# Patient Record
Sex: FEMALE | Race: WHITE | NOT HISPANIC OR LATINO | URBAN - METROPOLITAN AREA
[De-identification: names, ages, dates, MRNs, and addresses within clinical notes are randomized per-mention and may not be internally consistent; named-entity substitution may affect disease eponyms.]

---

## 2021-11-24 ENCOUNTER — OFFICE VISIT (OUTPATIENT)
Dept: FAMILY MEDICINE CLINIC | Facility: CLINIC | Age: 21
End: 2021-11-24

## 2021-11-24 VITALS
RESPIRATION RATE: 16 BRPM | HEIGHT: 67 IN | HEART RATE: 72 BPM | TEMPERATURE: 96.6 F | OXYGEN SATURATION: 98 % | BODY MASS INDEX: 24.8 KG/M2 | DIASTOLIC BLOOD PRESSURE: 69 MMHG | SYSTOLIC BLOOD PRESSURE: 107 MMHG | WEIGHT: 158 LBS

## 2021-11-24 DIAGNOSIS — Z00.00 GENERAL MEDICAL EXAMINATION: ICD-10-CM

## 2021-11-24 DIAGNOSIS — F32.A DEPRESSION, UNSPECIFIED DEPRESSION TYPE: Primary | ICD-10-CM

## 2021-11-24 PROCEDURE — 99203 OFFICE O/P NEW LOW 30 MIN: CPT

## 2021-11-24 RX ORDER — ESCITALOPRAM OXALATE 20 MG/1
20 TABLET ORAL DAILY
COMMUNITY
End: 2021-11-24 | Stop reason: SDUPTHER

## 2021-11-24 RX ORDER — ESCITALOPRAM OXALATE 20 MG/1
20 TABLET ORAL DAILY
Qty: 90 TABLET | Refills: 1 | Status: SHIPPED | OUTPATIENT
Start: 2021-11-24

## 2021-11-24 RX ORDER — BUPROPION HYDROCHLORIDE 150 MG/1
150 TABLET ORAL DAILY
Qty: 30 TABLET | Refills: 1 | Status: SHIPPED | OUTPATIENT
Start: 2021-11-24 | End: 2021-12-06 | Stop reason: SINTOL

## 2021-11-24 NOTE — PROGRESS NOTES
"Chief Complaint  Depression    Subjective          Stacy Sánchez presents to [facility]  History of Present Illness    Stacy Sánchez female 20 y.o., /69   Pulse 72   Temp 96.6 °F (35.9 °C)   Resp 16   Ht 170.2 cm (67\")   Wt 71.7 kg (158 lb)   SpO2 98%   BMI 24.75 kg/m²   who presents today for as a new patient for evaluation and treatment of Depression and Anxiety.  She reports depressed mood, crying spells, early AM awakening, fatigue, anxiety, anhedonia, impaired concentration, excessive worry, unable to relax, irritable, medication is working well, patient desires to continue on Escitalopram Rx, and needs refill. She reports the Escitalopram is working but has worked better in the past. Onset of symptoms was approximately 1 1/2 weeks ago.  She denies current suicidal and homicidal ideation. Risk factors are grief and recently moving from [place].  Previous treatment includes medication (SSRI) and therapy. She complains of the following medication side effects: none. The patient has previously been in counseling, agrees to start counseling, and has made an appointment with a local therapist.     PHQ-9 Depression scale score: 18       She has a problem list of the following: There is no problem list on file for this patient.        She has been compliant with the following medications:   Current Outpatient Medications:   •  escitalopram (LEXAPRO) 20 MG tablet, Take 1 tablet by mouth Daily., Disp: 90 tablet, Rfl: 1  •  buPROPion XL (Wellbutrin XL) 150 MG 24 hr tablet, Take 1 tablet by mouth Daily., Disp: 30 tablet, Rfl: 1.        She  denies medication side effects.      All of the other chronic condition(s) listed above are stable w/o issues.    /69   Pulse 72   Temp 96.6 °F (35.9 °C)   Resp 16   Ht 170.2 cm (67\")   Wt 71.7 kg (158 lb)   SpO2 98%   BMI 24.75 kg/m²     No results found for this or any previous visit.               Objective     Vital Signs:   BP " "107/69   Pulse 72   Temp 96.6 °F (35.9 °C)   Resp 16   Ht 170.2 cm (67\")   Wt 71.7 kg (158 lb)   SpO2 98%   BMI 24.75 kg/m²      Review of Systems   Constitutional: Negative for appetite change and fever.   HENT: Negative for nosebleeds and trouble swallowing.    Eyes: Negative for blurred vision and visual disturbance.   Respiratory: Negative for choking, shortness of breath and wheezing.    Cardiovascular: Negative for chest pain and palpitations.   Gastrointestinal: Negative for abdominal pain, blood in stool, constipation, diarrhea, nausea and vomiting.   Genitourinary: Negative.    Musculoskeletal: Negative.    Skin: Negative.    Allergic/Immunologic: Negative for immunocompromised state.   Neurological: Negative for syncope, facial asymmetry and speech difficulty.   Hematological: Negative for adenopathy.   Psychiatric/Behavioral: Positive for decreased concentration, sleep disturbance, depressed mood and stress. Negative for agitation, behavioral problems, dysphoric mood, self-injury, suicidal ideas and negative for hyperactivity. The patient is nervous/anxious.          Physical Exam  Vitals and nursing note reviewed.   Constitutional:       General: She is not in acute distress.     Appearance: Normal appearance. She is well-developed. She is not ill-appearing or toxic-appearing.   HENT:      Head: Normocephalic.      Right Ear: External ear normal.      Left Ear: External ear normal.   Eyes:      General: No scleral icterus.     Pupils: Pupils are equal, round, and reactive to light.   Neck:      Thyroid: No thyromegaly.      Vascular: No carotid bruit.   Cardiovascular:      Rate and Rhythm: Normal rate and regular rhythm.      Heart sounds: Normal heart sounds.   Pulmonary:      Effort: Pulmonary effort is normal. No respiratory distress.      Breath sounds: Normal breath sounds. No stridor.   Musculoskeletal:         General: No deformity.      Cervical back: Normal range of motion and neck " supple.      Right lower leg: No edema.      Left lower leg: No edema.   Skin:     General: Skin is warm.      Coloration: Skin is not jaundiced.   Neurological:      General: No focal deficit present.      Mental Status: She is alert and oriented to person, place, and time.      Motor: No weakness.   Psychiatric:         Attention and Perception: Attention normal.         Mood and Affect: Mood normal. Mood is not anxious or depressed. Affect is not tearful.         Speech: Speech normal.         Behavior: Behavior normal. Behavior is cooperative.         Thought Content: Thought content normal. Thought content does not include suicidal ideation. Thought content does not include suicidal plan.         Cognition and Memory: Cognition normal.         Judgment: Judgment normal.          Result Review :                Assessment and Plan      Diagnoses and all orders for this visit:    1. Depression, unspecified depression type (Primary)  -     Comprehensive metabolic panel  -     Lipid panel  -     CBC and Differential  -     TSH  -     buPROPion XL (Wellbutrin XL) 150 MG 24 hr tablet; Take 1 tablet by mouth Daily.  Dispense: 30 tablet; Refill: 1  -     escitalopram (LEXAPRO) 20 MG tablet; Take 1 tablet by mouth Daily.  Dispense: 90 tablet; Refill: 1    2. General medical examination  -     Comprehensive metabolic panel  -     Lipid panel  -     CBC and Differential  -     TSH            Follow Up     Return in about 6 weeks (around 1/5/2022) for Next scheduled follow up.    Patient was given instructions and counseling regarding her condition or for health maintenance advice. Please see specific information pulled into the AVS if appropriate.  After visit summary information was given to the patient today regarding managing depression as a teen.    Low glycemic index diet  Exercise 30 minutes most days of the week  Make sure you get results on any labs or tests we ordered today  We discussed medications and how to  take them as prescribed  Sleep 6-8 hours each night if possible  If you have not signed up for [employer], please activate your code ASAP from your After Visit Summary today    LDL goal <100  HDL goal >60  Triglyceride goal <150  BP goal =<130/80  Fasting glucose <100    Plan:  Patient's depression is recurrent and is moderate without psychosis. Their depression is currently active and the condition is worsening due to situational stressors and recent loss of grandparent. This will be reassessed in 6 weeks. F/U as described:patient will continue current medication therapy and will add Wellbutrin today.   Take all medications as prescribed.  Lab orders were placed today.  The patient has an upcoming appointment with a local therapist.  Bonds for safety were provided today.  The patient was encouraged to seek immediate mental health attention at [facility] emergency psychiatric services for worsening depression, suicidal thoughts, suicidal plan, or thoughts of self-harm.  The patient denies suicidal thoughts, suicidal plan, or thoughts of self-harm. The patient verbalized understanding of all instructions given today.  Will follow up with this patient in 6 weeks to reevaluate medication therapy.

## 2021-11-24 NOTE — PATIENT INSTRUCTIONS
"http://APA.org\"> https://clinicalkey.com\"> https://clinicalkey.com\"> http://point-of-care.Texxi.com\">   Managing Depression, Teen  Depression is a mental health condition that can affect your thoughts, feelings, and behavior. You may feel down, blue, or sad, or you may be irritable and sylvester. If you have been diagnosed with depression, you may be relieved to know now why you have felt or behaved a certain way. If you are living with depression, there are ways to help you relieve the symptoms and feel better.  How to manage lifestyle changes  Managing stress  Stress is your body's reaction to life's demands. You can have stress from good things, such as a vacation, or difficult things, such as a hard test. Stress that lasts a long time can play a part in depression, so it is important to learn how to manage stress.  Try some of the following approaches for reducing your tension and helping to manage stress (stress reduction techniques):  · If you play an instrument, take some time to play it, or listen to music that helps you feel calm.  · Try using a meditation rosa elena.  · Do some deep breathing. To do this, inhale slowly through your nose. Pause at the top of your inhale for a few seconds and then exhale slowly, letting yourself relax. Repeat this three or four times.  There are several other things you can do to help you manage depression, such as:  · Spending time in nature.  · Spending time with trusted friends who help you feel better.  · Taking time to think about the positive things in your life.  · Exercising, such as playing an active game with friends or going for a run or a bike ride.  · Spending less time using electronics, especially at night before bed. Using electronic screens before bed makes your brain think it is time to get up rather than go to bed.  · Limit how much time you watch TV or play video games. These activities might feel good for a while, but in the end, they are a way " to avoid the feelings of depression.  Medicines  Antidepressants are often prescribed by a health care provider to ease the symptoms of depression. When used together, medicines, psychotherapy, and stress reduction techniques are often the most effective treatment.  Medicines take time to work. You may not notice the full benefits of your medicine for 4-8 weeks.  · Do not stop taking your medicine. Talk to your health care provider and have a plan to lower your dose safely.  Relationships    Relationships are important to people throughout their lives. Friends and family can be great resources to help you deal with the difficult feelings you get from depression. Talk to family and friends when things are becoming difficult. You may also want to talk with a therapist. A relationship with a therapist may be very important to helping you manage your depression.  How to recognize changes  Everyone responds differently to treatment for depression. Recovery from depression happens when your symptoms have gone away and you may:  · Have more interest in doing activities.  · Feel hopeful again.  · Have more energy.  · Have fewer problems with eating too much or too little food.  · Have better mental focus.  If you find your depression does not change, you may still:  · Have problems sleeping or waking, feel tired all the time, or have trouble focusing.  · Have changes in your appetite. You may lose or gain weight without trying.  · Have constant headaches or stomachaches.  · Want to be alone or avoid interacting with others.  · Lack interest in doing the things you usually like to do.  · Feel angry or irritated most of the time.  · Think about death, or consider suicide.  · Use alcohol, drugs, or tobacco or nicotine products.  Follow these instructions at home:  Activity    · Spend time with trusted friends who help you feel better.  · Get some form of activity each day, such as walking, biking, or any movement activity you  enjoy.  · Practice self-calming and other stress reduction techniques.    Lifestyle  · Get the right amount and quality of sleep.  · Do not use drugs. Do not drink alcohol.  · Eat a healthy diet that includes plenty of vegetables, fruits, whole grains, low-fat dairy products, and lean protein. Do not eat a lot of foods that are high in solid fats, added sugars, or salt (sodium).  General instructions  · Take over-the-counter and prescription medicines only as told by your health care provider. Tell your health care provider about the positive and negative effects you are having from your medicines.  · Keep all follow-up visits as told by your health care provider. This is important.  Where to find support  Talking to others  Although depression is serious, support is available. Resources may include:  · Suicide prevention, crisis prevention, and depression hotlines.  · School teachers, counselors, coaches, or clergy.  · Parents or other family members.  · Trusted friends.  · Support groups.  Therapy and support groups  You can locate a counselor or support group from one of these sources:  · Anxiety and Depression Association of [place] (ADAA): www.adaa.org  · Mental Health [place]: www.mentalhealthamerica.net  · National [place] on Mental Illness (GAGE): www.gage.org  Contact a health care provider if:  · You stop taking your antidepressant medicines, and you have any of these symptoms:  ? Nausea.  ? Headache.  ? Light-headedness.  ? Chills and body aches.  ? Not being able to sleep (insomnia).  · You or your friends and family think your depression is getting worse.  Get help right away if:  · You feel suicidal and are planning suicide.  · You are drinking or using drugs excessively.  · You are cutting yourself or thinking about it.  · You are thinking about hurting others and are making a plan to do so.  If you ever feel like you may hurt yourself or others, or have thoughts about taking your own life, get help  right away. Go to your nearest emergency department or:  · Call your local emergency services (911 in the [place]  · Call a suicide crisis helpline, such as the National Suicide Prevention Lifeline at 1-326.125.4983. This is open 24 hours a day in the [place]  · Text the Crisis Text Line at 705448 (in the [place]  Summary  · There are ways to help you relieve your symptoms of depression.  · Work with your health care provider on a care plan that includes stress reduction techniques, medicines (if applicable), therapy, and healthy lifestyle habits.  · A relationship with a therapist may be very important to helping you manage your depression.  · If you have thoughts about taking your own life, call a suicide crisis helpline or text a crisis text line.  This information is not intended to replace advice given to you by your health care provider. Make sure you discuss any questions you have with your health care provider.  Document Revised: 10/28/2020 Document Reviewed: 10/28/2020  Elsevier Patient Education © 2021 [employer].

## 2021-11-25 LAB
ALBUMIN SERPL-MCNC: 4.7 G/DL (ref 3.9–5)
ALBUMIN/GLOB SERPL: 1.6 {RATIO} (ref 1.2–2.2)
ALP SERPL-CCNC: 67 IU/L (ref 42–106)
ALT SERPL-CCNC: 13 IU/L (ref 0–32)
AST SERPL-CCNC: 17 IU/L (ref 0–40)
BASOPHILS # BLD AUTO: 0 X10E3/UL (ref 0–0.2)
BASOPHILS NFR BLD AUTO: 1 %
BILIRUB SERPL-MCNC: 0.3 MG/DL (ref 0–1.2)
BUN SERPL-MCNC: 9 MG/DL (ref 6–20)
BUN/CREAT SERPL: 13 (ref 9–23)
CALCIUM SERPL-MCNC: 10.1 MG/DL (ref 8.7–10.2)
CHLORIDE SERPL-SCNC: 101 MMOL/L (ref 96–106)
CHOLEST SERPL-MCNC: 187 MG/DL (ref 100–169)
CO2 SERPL-SCNC: 25 MMOL/L (ref 20–29)
CREAT SERPL-MCNC: 0.67 MG/DL (ref 0.57–1)
EOSINOPHIL # BLD AUTO: 0 X10E3/UL (ref 0–0.4)
EOSINOPHIL NFR BLD AUTO: 1 %
ERYTHROCYTE [DISTWIDTH] IN BLOOD BY AUTOMATED COUNT: 12.7 % (ref 11.7–15.4)
GLOBULIN SER CALC-MCNC: 3 G/DL (ref 1.5–4.5)
GLUCOSE SERPL-MCNC: 90 MG/DL (ref 65–99)
HCT VFR BLD AUTO: 40 % (ref 34–46.6)
HDLC SERPL-MCNC: 46 MG/DL
HGB BLD-MCNC: 13.3 G/DL (ref 11.1–15.9)
IMM GRANULOCYTES # BLD AUTO: 0 X10E3/UL (ref 0–0.1)
IMM GRANULOCYTES NFR BLD AUTO: 0 %
LDLC SERPL CALC-MCNC: 126 MG/DL (ref 0–109)
LYMPHOCYTES # BLD AUTO: 2 X10E3/UL (ref 0.7–3.1)
LYMPHOCYTES NFR BLD AUTO: 26 %
MCH RBC QN AUTO: 29.2 PG (ref 26.6–33)
MCHC RBC AUTO-ENTMCNC: 33.3 G/DL (ref 31.5–35.7)
MCV RBC AUTO: 88 FL (ref 79–97)
MONOCYTES # BLD AUTO: 0.6 X10E3/UL (ref 0.1–0.9)
MONOCYTES NFR BLD AUTO: 8 %
NEUTROPHILS # BLD AUTO: 4.9 X10E3/UL (ref 1.4–7)
NEUTROPHILS NFR BLD AUTO: 64 %
PLATELET # BLD AUTO: 274 X10E3/UL (ref 150–450)
POTASSIUM SERPL-SCNC: 4.5 MMOL/L (ref 3.5–5.2)
PROT SERPL-MCNC: 7.7 G/DL (ref 6–8.5)
RBC # BLD AUTO: 4.56 X10E6/UL (ref 3.77–5.28)
SODIUM SERPL-SCNC: 139 MMOL/L (ref 134–144)
TRIGL SERPL-MCNC: 79 MG/DL (ref 0–89)
TSH SERPL DL<=0.005 MIU/L-ACNC: 1.39 UIU/ML (ref 0.45–4.5)
VLDLC SERPL CALC-MCNC: 15 MG/DL (ref 5–40)
WBC # BLD AUTO: 7.6 X10E3/UL (ref 3.4–10.8)

## 2021-11-26 NOTE — PROGRESS NOTES
Please let the patient know her total and LDL cholesterol were mildly elevated. I need her to work on a low cholesterol diet and daily exercise. I will re-check this at her next appointment. All other labs are normal.

## 2021-12-06 ENCOUNTER — OFFICE VISIT (OUTPATIENT)
Dept: FAMILY MEDICINE CLINIC | Facility: CLINIC | Age: 21
End: 2021-12-06

## 2021-12-06 VITALS
WEIGHT: 155 LBS | OXYGEN SATURATION: 99 % | HEART RATE: 83 BPM | RESPIRATION RATE: 16 BRPM | DIASTOLIC BLOOD PRESSURE: 66 MMHG | SYSTOLIC BLOOD PRESSURE: 101 MMHG | TEMPERATURE: 97.1 F | BODY MASS INDEX: 24.33 KG/M2 | HEIGHT: 67 IN

## 2021-12-06 DIAGNOSIS — F32.A DEPRESSION, UNSPECIFIED DEPRESSION TYPE: Primary | ICD-10-CM

## 2021-12-06 PROCEDURE — 99214 OFFICE O/P EST MOD 30 MIN: CPT

## 2021-12-06 NOTE — PROGRESS NOTES
"Chief Complaint  Blurred vision due to medication    Subjective          Stacy Sánchez presents to [facility]  History of Present Illness    Stacy Sánchez female 20 y.o., /66   Pulse 83   Temp 97.1 °F (36.2 °C)   Resp 16   Ht 170.2 cm (67\")   Wt 70.3 kg (155 lb)   SpO2 99%   BMI 24.28 kg/m²   who presents today for follow up of Depression.  She reports the Wellbutrin that was added at the last visit is helping, but she is experiencing side effects of blurred vision. Onset of symptoms was approximately 4 days ago. She denies current suicidal and homicidal ideation. Risk factors are family situation/stress and grief.  Previous treatment includes medication (SSRI). She complains of the following medication side effects:blurred vision.  No headaches, dizziness, seizures, near-syncope, syncope, or weakness.  The patient is currently in counseling.    The patient was started on Wellbutrin  mg on 11/24/2021. She was already taking Escitalopram 20 mg daily. She started taking the Wellbutrin XL on 11/25/2021 and then started experiencing blurred vision about one week after starting the medication. She has been experiencing blurred vision daily.     She has a problem list of the following: There is no problem list on file for this patient.        She has been compliant with the following medications:   Current Outpatient Medications:   •  escitalopram (LEXAPRO) 20 MG tablet, Take 1 tablet by mouth Daily., Disp: 90 tablet, Rfl: 1.        She  denies medication side effects.      All of the other chronic condition(s) listed above are stable w/o issues.    /66   Pulse 83   Temp 97.1 °F (36.2 °C)   Resp 16   Ht 170.2 cm (67\")   Wt 70.3 kg (155 lb)   SpO2 99%   BMI 24.28 kg/m²     Results for orders placed or performed in visit on 11/24/21   Comprehensive metabolic panel    Specimen: Blood   Result Value Ref Range    Glucose 90 65 - 99 mg/dL    BUN 9 6 - 20 mg/dL    " "Creatinine 0.67 0.57 - 1.00 mg/dL    eGFR Non African Am 128 >59 mL/min/1.73    eGFR African Am 147 >59 mL/min/1.73    BUN/Creatinine Ratio 13 9 - 23    Sodium 139 134 - 144 mmol/L    Potassium 4.5 3.5 - 5.2 mmol/L    Chloride 101 96 - 106 mmol/L    Total CO2 25 20 - 29 mmol/L    Calcium 10.1 8.7 - 10.2 mg/dL    Total Protein 7.7 6.0 - 8.5 g/dL    Albumin 4.7 3.9 - 5.0 g/dL    Globulin 3.0 1.5 - 4.5 g/dL    A/G Ratio 1.6 1.2 - 2.2    Total Bilirubin 0.3 0.0 - 1.2 mg/dL    Alkaline Phosphatase 67 42 - 106 IU/L    AST (SGOT) 17 0 - 40 IU/L    ALT (SGPT) 13 0 - 32 IU/L   Lipid panel    Specimen: Blood   Result Value Ref Range    Total Cholesterol 187 (H) 100 - 169 mg/dL    Triglycerides 79 0 - 89 mg/dL    HDL Cholesterol 46 >39 mg/dL    VLDL Cholesterol Hector 15 5 - 40 mg/dL    LDL Chol Calc (NIH) 126 (H) 0 - 109 mg/dL   TSH    Specimen: Blood   Result Value Ref Range    TSH 1.390 0.450 - 4.500 uIU/mL   CBC and Differential    Specimen: Blood   Result Value Ref Range    WBC 7.6 3.4 - 10.8 x10E3/uL    RBC 4.56 3.77 - 5.28 x10E6/uL    Hemoglobin 13.3 11.1 - 15.9 g/dL    Hematocrit 40.0 34.0 - 46.6 %    MCV 88 79 - 97 fL    MCH 29.2 26.6 - 33.0 pg    MCHC 33.3 31.5 - 35.7 g/dL    RDW 12.7 11.7 - 15.4 %    Platelets 274 150 - 450 x10E3/uL    Neutrophil Rel % 64 Not Estab. %    Lymphocyte Rel % 26 Not Estab. %    Monocyte Rel % 8 Not Estab. %    Eosinophil Rel % 1 Not Estab. %    Basophil Rel % 1 Not Estab. %    Neutrophils Absolute 4.9 1.4 - 7.0 x10E3/uL    Lymphocytes Absolute 2.0 0.7 - 3.1 x10E3/uL    Monocytes Absolute 0.6 0.1 - 0.9 x10E3/uL    Eosinophils Absolute 0.0 0.0 - 0.4 x10E3/uL    Basophils Absolute 0.0 0.0 - 0.2 x10E3/uL    Immature Granulocyte Rel % 0 Not Estab. %    Immature Grans Absolute 0.0 0.0 - 0.1 x10E3/uL                  Objective     Vital Signs:   /66   Pulse 83   Temp 97.1 °F (36.2 °C)   Resp 16   Ht 170.2 cm (67\")   Wt 70.3 kg (155 lb)   SpO2 99%   BMI 24.28 kg/m²      Review of " Systems   Constitutional: Negative for appetite change and fever.   HENT: Negative for nosebleeds and trouble swallowing.    Eyes: Positive for blurred vision. Negative for double vision and visual disturbance.   Respiratory: Negative for choking, shortness of breath and wheezing.    Cardiovascular: Negative for chest pain and palpitations.   Gastrointestinal: Negative for abdominal pain and blood in stool.   Genitourinary: Negative.    Musculoskeletal: Negative.    Skin: Negative.    Allergic/Immunologic: Negative for immunocompromised state.   Neurological: Negative for dizziness, seizures, syncope, facial asymmetry, speech difficulty, weakness, light-headedness, numbness and headache.   Hematological: Negative for adenopathy.   Psychiatric/Behavioral: Negative for dysphoric mood, self-injury, suicidal ideas, depressed mood and stress. The patient is not nervous/anxious.          Physical Exam  Vitals and nursing note reviewed.   Constitutional:       General: She is not in acute distress.     Appearance: Normal appearance. She is well-developed. She is not ill-appearing or toxic-appearing.   HENT:      Head: Normocephalic.      Right Ear: External ear normal.      Left Ear: External ear normal.   Eyes:      General: No scleral icterus.     Pupils: Pupils are equal, round, and reactive to light.   Neck:      Thyroid: No thyromegaly.      Vascular: No carotid bruit.   Cardiovascular:      Rate and Rhythm: Normal rate and regular rhythm.      Heart sounds: Normal heart sounds.   Pulmonary:      Effort: Pulmonary effort is normal. No respiratory distress.      Breath sounds: Normal breath sounds. No stridor.   Musculoskeletal:         General: No deformity.      Cervical back: Normal range of motion and neck supple.      Right lower leg: No edema.      Left lower leg: No edema.   Skin:     General: Skin is warm.      Coloration: Skin is not jaundiced.   Neurological:      General: No focal deficit present.       Mental Status: She is alert and oriented to person, place, and time.      Motor: No weakness.   Psychiatric:         Attention and Perception: Attention normal.         Mood and Affect: Mood normal. Mood is not anxious or depressed. Affect is not tearful.         Speech: Speech normal.         Behavior: Behavior normal. Behavior is cooperative.         Thought Content: Thought content normal. Thought content does not include suicidal ideation. Thought content does not include suicidal plan.         Cognition and Memory: Cognition normal.         Judgment: Judgment normal.          Result Review :                Assessment and Plan      Diagnoses and all orders for this visit:    1. Depression, unspecified depression type (Primary)  Comments:  Continue Escitalopram 20 mg daily            Follow Up     Return if symptoms worsen or fail to improve.    Patient was given instructions and counseling regarding her condition or for health maintenance advice. Please see specific information pulled into the AVS if appropriate.     -Discontinue Wellbutrin XL due to side effects. Continue Escitalopram 20 mg daily.   -She has an appointment with a therapist today. This will be her first appointment with a therapist in this area.    -Bonds for safety were provided today.  The patient was instructed to seek immediate mental health evaluation at [facility] emergency psychiatric services for worsening depression, thoughts of suicide, suicidal plan, or thoughts of self-harm.  The patient verbalized understanding of all instructions given today.  -Return for worsening signs or symptoms.